# Patient Record
Sex: FEMALE | Race: BLACK OR AFRICAN AMERICAN | URBAN - METROPOLITAN AREA
[De-identification: names, ages, dates, MRNs, and addresses within clinical notes are randomized per-mention and may not be internally consistent; named-entity substitution may affect disease eponyms.]

---

## 2024-04-16 ENCOUNTER — OFFICE VISIT (OUTPATIENT)
Dept: URGENT CARE | Facility: CLINIC | Age: 44
End: 2024-04-16

## 2024-04-16 VITALS
HEART RATE: 82 BPM | WEIGHT: 236 LBS | DIASTOLIC BLOOD PRESSURE: 83 MMHG | BODY MASS INDEX: 43.16 KG/M2 | SYSTOLIC BLOOD PRESSURE: 118 MMHG

## 2024-04-16 DIAGNOSIS — Z11.1 SCREENING-PULMONARY TB: Primary | ICD-10-CM

## 2024-04-16 PROBLEM — K43.2 INCISIONAL HERNIA, WITHOUT OBSTRUCTION OR GANGRENE: Status: ACTIVE | Noted: 2020-02-22

## 2024-04-16 PROBLEM — R10.9 INTRACTABLE ABDOMINAL PAIN: Status: RESOLVED | Noted: 2020-02-21 | Resolved: 2024-04-16

## 2024-04-16 PROCEDURE — 86580 TB INTRADERMAL TEST: CPT

## 2024-04-16 NOTE — PROGRESS NOTES
Patient presents with request for tb testing for employment screening. Patient states that they were born in the [place] and do not believe that they received the tb vaccine as a child. Patient denies previous positive tb screening or treatment, chest pain, shortness of breath, fever/chills, night sweats, muscle aches, abdominal pain, nausea/vomiting/diarrhea, neck pain, dizziness, headache, and any rashes at this time.    /83 (BP Location: Right arm, Patient Position: Sitting, BP Cuff Size: Adult long)   Pulse 82   Wt 107 kg (236 lb)   BMI 43.16 kg/m²       PPD Placement note    Laurita Quiroga, 43 y.o. female is here today for placement of PPD test  Reason for PPD test: employment    Pt taken PPD test before: yes    Verified in allergy area and with patient that they are not allergic to the products PPD is made of (Phenol or Tween). Yes  Is patient taking any oral or IV steroid medication now or have they taken it in the last month? no    Has the patient ever received the BCG vaccine?: no  Has the patient been in recent contact with anyone known or suspected of having active TB disease?: no       Date of exposure (if applicable):        Name of person they were exposed to (if applicable):     Patient's Country of origin?: [place]    Alert and oriented x4    PPD placed on 4/16/2024 6:24pm.     Patient advised to return for reading within 48-72 hours.